# Patient Record
Sex: MALE | Race: WHITE
[De-identification: names, ages, dates, MRNs, and addresses within clinical notes are randomized per-mention and may not be internally consistent; named-entity substitution may affect disease eponyms.]

---

## 2019-01-18 ENCOUNTER — HOSPITAL ENCOUNTER (OUTPATIENT)
Dept: HOSPITAL 39 - GMAE | Age: 64
End: 2019-01-18
Attending: FAMILY MEDICINE
Payer: COMMERCIAL

## 2019-01-18 DIAGNOSIS — Z00.00: Primary | ICD-10-CM

## 2019-01-25 ENCOUNTER — HOSPITAL ENCOUNTER (OUTPATIENT)
Dept: HOSPITAL 39 - CT | Age: 64
End: 2019-01-25
Attending: FAMILY MEDICINE
Payer: COMMERCIAL

## 2019-01-25 DIAGNOSIS — M47.892: ICD-10-CM

## 2019-01-25 DIAGNOSIS — R22.1: Primary | ICD-10-CM

## 2019-01-25 NOTE — CT
EXAM DESCRIPTION:

Soft Tissue Neck: Computed Tomography



CLINICAL HISTORY:

64 years Male, NECK MASS. Patient states he has felt this for a

while.



COMPARISON:

None.



TECHNIQUE: 

Spiral, axial 2.5 mm scans through the neck soft tissues after

infusion of IV contrast. Sagittal and coronal 2.0 mm

reconstructions. No adverse reactions.  Total Exam DLP: 352.71

mGy-cm.  This exam was performed according to our departmental CT

dose-optimization program which includes automated exposure

control, adjustment of the mA and/or kV according to patient size

and/or use of iterative reconstruction technique; to reduce

radiation dose to as low as reasonably achievable (ALARA).



FINDINGS: No subcutaneous adipose tissue or peripheral facial

muscle enlargement. No abnormal contrast enhancement.

Submandibular glands are not symmetrically located but are

similar in size. Parotid salivary glands are unremarkable. Left

sternocleidomastoid muscle slightly thicker than the

contralateral right muscle but normal enhancement. No enlarged

lymph nodes in the paracervical space, carotid space, or

parapharyngeal spaces. Nasopharyngeal airway, oral pharyngeal

airway, and hypertrophic pharynx are not effaced or displaced.



Spondylosis at C3-4 with possible left neural foraminal stenosis.

Left C4-5 facet arthrosis with left neural foraminal stenosis.

Spondylosis at C5-6 with significant bilateral foraminal

narrowing. Same findings at C6-7 and C7-T1 with neuroforamen at

the latter level not significantly narrowed.



Bilateral apical pleural thickening. Minimally dilated airspaces

in the lung apices. Thyroid gland not enlarged.



IMPRESSION: 

Normal soft tissues of neck on CT neck with IV contrast.  No

abnormal fluid or enhancement.  Muscle asymmetry not pathologic. 

Multiple levels of cervical spondylosis and neural foraminal

narrowing and stenosis. Correlate for radiculopathy.



Electronically signed by:  Anirudh Powers MD  1/25/2019 10:47 AM

[facility] Workstation: [id number]